# Patient Record
(demographics unavailable — no encounter records)

---

## 2024-10-21 NOTE — PLAN
[TextEntry] : CXR stat. Doxy. Prednisone. Continue trelegy. Rinse mouth after use. Albuterol prn.   If any worsening get to ER or UC. Cardiology f/u. Annual flu vaccines. Exercise.

## 2024-10-21 NOTE — CONSULT LETTER
[Dear  ___] : Dear  [unfilled], [Courtesy Letter:] : I had the pleasure of seeing your patient, [unfilled], in my office today. [Consult Closing:] : Thank you very much for allowing me to participate in the care of this patient.  If you have any questions, please do not hesitate to contact me. [Karson Landeros MD] : Karson Landeros MD

## 2024-10-21 NOTE — HISTORY OF PRESENT ILLNESS
[Former] : former [Snoring] : snoring [Daytime Somnolence] : daytime somnolence [Awakes Unrefreshed] : awakening unrefreshed [Recent  Weight Gain] : recent weight gain [Date: ___] : Date of most recent diagnostic polysomnogram: [unfilled] [AHI: ___ per hour] : Apnea-hypopnea index:  [unfilled] per hour [YearQuit] : 2015 [FreeTextEntry1] : Hx emphysema.   Acute visit.  About 8 d/a had nasal congestion, fever to 102 for 3 days; no trt sought. Took tylenol. Felt better. Then, about 3 days ago begn with chest congestion, cough, mucous. No more fever. More sob, wheeze. Now on trelegy.  Albuterol helping. Mucinex helping.   F/U CT chest following changes during URI reviewed below.   In the past, had PET/CT for GG nodule. Minimal FDG activity. Saw Dr Wyatt. Would need lobectomy given location.   Hx CAD.   Quit smoking in 2015. Works as a RN at the hospital.

## 2024-10-21 NOTE — PHYSICAL EXAM
[General Appearance - In No Acute Distress] : no acute distress [Normal Conjunctiva] : the conjunctiva exhibited no abnormalities [Low Lying Soft Palate] : low lying soft palate [Elongated Uvula] : elongated uvula [III] : III [Neck Appearance] : the appearance of the neck was normal [] : the neck was supple [Heart Rate And Rhythm] : heart rate and rhythm were normal [Heart Sounds] : normal S1 and S2 [Bowel Sounds] : normal bowel sounds [Abdomen Soft] : soft [Abnormal Walk] : normal gait [Nail Clubbing] : no clubbing of the fingernails [Cyanosis, Localized] : no localized cyanosis [Cranial Nerves] : cranial nerves 2-12 were intact [No Focal Deficits] : no focal deficits [Oriented To Time, Place, And Person] : oriented to person, place, and time [Impaired Insight] : insight and judgment were intact [Affect] : the affect was normal [Normal Rate] : the respiratory rate was normal [Rate ___] : at [unfilled] breaths per minute [Normal Rhythm/Effort] : normal respiratory rhythm and effort [Skin Color & Pigmentation] : normal skin color and pigmentation [Normal Oropharynx] : abnormal oropharynx [Rhonchi Bilateral] : rhonchi were heard diffusely over both lungs

## 2024-10-21 NOTE — REVIEW OF SYSTEMS
Patient called with multiple concerns including feeling like she's not getting enough oxygen to her brain. States she feels more labored with her breathing and more fatigued. She also feels like her sternum is  where she had CABG. Denies any chest pain. States she is becoming more forgetful as well.     Will review with Dr Rudolph Izaguirre [As Noted in HPI] : as noted in HPI [Negative] : Endocrine [Fever] : no fever [Chills] : no chills [FreeTextEntry9] : hx CAD

## 2024-10-21 NOTE — PROCEDURE
[FreeTextEntry1] : geo done 6/3/24: mod obstruction, FEV1 78% pred  ----- EXAM: [id number] - CT CHEST - ORDERED BY: SHEBA GOMEZ   PROCEDURE DATE: 06/19/2023    INTERPRETATION: INDICATION: Lung nodules, right middle lobe atelectasis TECHNIQUE: Unenhanced CT of the chest. Coronal, sagittal, and MIP images were reconstructed and reviewed. COMPARISON: March 8, 2023 chest CT.  FINDINGS:  AIRWAYS, LUNGS, PLEURA: Patent central airways. Emphysema. Stable cluster of small nodules within right upper lobe. Right middle lobe atelectasis is resolved. Stable bandlike atelectasis within the lingula. No pleural effusion.  LYMPH NODES, MEDIASTINUM: No lymphadenopathy.  HEART, VESSELS: Heart size is normal. No pericardial effusion. Thoracic aorta normal in diameter. Coronary and aortic calcifications.  VISUALIZED UPPER ABDOMEN: Cholecystectomy. Small left renal cyst.  CHEST WALL, BONES: No aggressive osseous lesion.  LOWER NECK: Within normal limits.  IMPRESSION:  Stable cluster of small nodules within right upper lobe. Right middle lobe atelectasis is resolved. Stable bandlike atelectasis within the lingula.  Emphysema. Recommend annual lung cancer screening with low-dose chest CT if the patient meets the criteria.  --- End of Report ---       LAURO GAMING MD; Attending Radiologist This document has been electronically signed. Jun 28

## 2024-12-10 NOTE — HISTORY OF PRESENT ILLNESS
[Former] : Former [TextBox_13] : Ms. RICE is a 61 year old female with a history of CAD s/p stents, HTN, high cholesterol, COPD and skin cancer.. Reviewed and confirmed that the patient meets screening eligibility criteria: 61 years old Smoking Status: Former smoker Number of pack(s) per day: 1 Number of years smoked: 35 Number of pack years smokin Quit year:   No symptoms of lung cancer, including new cough, change in cough, hemoptysis, and unintentional weight loss. No personal history of lung cancer. No lung cancer in a first degree relative. No history of occupational exposures.   [YearQuit] : 2015 [PacksperYear] : 35

## 2024-12-19 NOTE — PLAN
[TextEntry] : ANother course of prednisone. Sputum Cx. Albuterol via neb prn or MDI prn. Hypersal nebs BID. Continue trelegy. Rinse mouth after use.  Mucinex. Repeat CT in 3 months. Cardiology f/u. Annual flu vaccines. Exercise.

## 2024-12-19 NOTE — HISTORY OF PRESENT ILLNESS
[Former] : former [Snoring] : snoring [Daytime Somnolence] : daytime somnolence [Awakes Unrefreshed] : awakening unrefreshed [Recent  Weight Gain] : recent weight gain [Date: ___] : Date of most recent diagnostic polysomnogram: [unfilled] [AHI: ___ per hour] : Apnea-hypopnea index:  [unfilled] per hour [YearQuit] : 2015 [FreeTextEntry1] : Hx emphysema.   re 10/21/24 fo an acute visit with [facility]. Prednisone and doxy given. CXR done 10/21/24 showed atx vs scarring L base, no CHF, no pna. She was called and was feeling better.   Improved then about 2 weeks ago more cough, tightness, not much mucous. Wet cough though. Took prednisone she had at home for about 2 days; stopped 12/16/24. Now on trelegy.  Albuterol helping. Mucinex helping.   F/U CT chest done 12/11/24 reviewed below; feeling not well at the time of the CT.   In the past, had PET/CT for GG nodule. Minimal FDG activity. Saw Dr Wyatt. Would need lobectomy given location.   Hx CAD.   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

## 2024-12-19 NOTE — PHYSICAL EXAM
[General Appearance - In No Acute Distress] : no acute distress [Normal Conjunctiva] : the conjunctiva exhibited no abnormalities [Low Lying Soft Palate] : low lying soft palate [Elongated Uvula] : elongated uvula [III] : III [Neck Appearance] : the appearance of the neck was normal [] : the neck was supple [Heart Rate And Rhythm] : heart rate and rhythm were normal [Heart Sounds] : normal S1 and S2 [Bowel Sounds] : normal bowel sounds [Abdomen Soft] : soft [Abnormal Walk] : normal gait [Nail Clubbing] : no clubbing of the fingernails [Cyanosis, Localized] : no localized cyanosis [Cranial Nerves] : cranial nerves 2-12 were intact [No Focal Deficits] : no focal deficits [Oriented To Time, Place, And Person] : oriented to person, place, and time [Impaired Insight] : insight and judgment were intact [Affect] : the affect was normal [Normal Rate] : the respiratory rate was normal [Rate ___] : at [unfilled] breaths per minute [Normal Rhythm/Effort] : normal respiratory rhythm and effort [Rhonchi Bilateral] : rhonchi were heard diffusely over both lungs [Skin Color & Pigmentation] : normal skin color and pigmentation [Normal Oropharynx] : abnormal oropharynx [Diffuse Wheezing] : diffuse wheezing was heard [Rhonchi Right] : rhonchi were heard diffusely over the right lung

## 2024-12-19 NOTE — PROCEDURE
[FreeTextEntry1] : geo done 6/3/24: mod obstruction, FEV1 78% pred  ----- EXAM: [id number] - CT LDCT LUNG CA SCREENING - ORDERED BY: SHEBA GOMEZ   PROCEDURE DATE: 12/11/2024    INTERPRETATION: CLINICAL INFORMATION: Lung Cancer Screening. Former smoker with a 35 pack year smoking history quit 2015. 61-year-old female with CAD, status post stents, hypertension COPD.  COMPARISON: 6/19/2023 Submitted for my review 12/16/2024  TECHNIQUE: Low Dose Chest CT was obtained without intravenous contrast.  FINDINGS:  AIRWAYS/LUNGS/PLEURA: Patent central airways. Mild centrilobular emphysema with bilateral upper lobe blebs. No pulmonary consolidation. No pulmonary masses. Right middle lobe and inferior lingula linear atelectasis or scarring. No pleural effusion.  Again noted is a cluster of nodular densities within the right upper lobe each somewhat branching and ill-defined part solid, the more superior lesion extending from images 254 through 286 measuring 0.5 x 1.1 cm and the slightly more inferior lesion extending from 287 through 315 measuring 0.6 x 1.0 cm maximum dimension. An additional 0.6 cm nodular density series 5 image 277. These have minimally increased in length as compared to prior exam prior exam. They are nonspecific however may represent areas of mucus plugging and/or inflammatory process however neoplastic process cannot be entirely excluded..  MEDIASTINUM AND LILLIE: No lymphadenopathy.  VESSELS: Within normal limits caliber. Scattered atherosclerotic calcifications throughout the aorta..  HEART: Heart size is normal. No pericardial effusion. Coronary artery calcifications and/or stents.  VISUALIZED UPPER ABDOMEN: Within normal limits.  CHEST WALL AND BONES: The chest wall is within normal limits. Multilevel degenerative changes of the spine.  IMPRESSION: Branching ill-defined port solid cluster of nodular densities within the right upper lobe as described above, slightly increased in size from prior exam.. While this may be related to inflammatory process and/or mucous plugging, cannot exclude neoplastic process.  No new nodules or masses.  Mild centrilobular emphysema  LungRADS 4B Very Suspicious  Recommendation: Diagnostic Chest CT With or Without Contrast, PET CT or Tissue Sampling  --- End of Report ---       CED MAYER MD; Attending Radiologist This document has been electronically signed. Dec 16 2024 5:04PM  --------- EXAM: [id number] - CT CHEST - ORDERED BY: SHEBA GOMEZ   PROCEDURE DATE: 06/19/2023    INTERPRETATION: INDICATION: Lung nodules, right middle lobe atelectasis TECHNIQUE: Unenhanced CT of the chest. Coronal, sagittal, and MIP images were reconstructed and reviewed. COMPARISON: March 8, 2023 chest CT.  FINDINGS:  AIRWAYS, LUNGS, PLEURA: Patent central airways. Emphysema. Stable cluster of small nodules within right upper lobe. Right middle lobe atelectasis is resolved. Stable bandlike atelectasis within the lingula. No pleural effusion.  LYMPH NODES, MEDIASTINUM: No lymphadenopathy.  HEART, VESSELS: Heart size is normal. No pericardial effusion. Thoracic aorta normal in diameter. Coronary and aortic calcifications.  VISUALIZED UPPER ABDOMEN: Cholecystectomy. Small left renal cyst.  CHEST WALL, BONES: No aggressive osseous lesion.  LOWER NECK: Within normal limits.  IMPRESSION:  Stable cluster of small nodules within right upper lobe. Right middle lobe atelectasis is resolved. Stable bandlike atelectasis within the lingula.  Emphysema. Recommend annual lung cancer screening with low-dose chest CT if the patient meets the criteria.  --- End of Report ---       LAURO GAMING MD; Attending Radiologist This document has been electronically signed. Jun 28

## 2024-12-19 NOTE — REVIEW OF SYSTEMS
[As Noted in HPI] : as noted in HPI [Negative] : Endocrine [Fever] : no fever [Chills] : no chills [FreeTextEntry9] : hx CAD

## 2025-04-03 NOTE — PROCEDURE
[FreeTextEntry1] : geo done 6/3/24: mod obstruction, FEV1 78% pred  ----- EXAM: [id number] - CT CHEST - ORDERED BY: SHEBA GOMEZ   PROCEDURE DATE: 03/18/2025    INTERPRETATION: CLINICAL INFORMATION: Nodule follow-up  COMPARISON: 12/11/2024 and 6/19/2023  CONTRAST/COMPLICATIONS: IV Contrast: NONE Oral Contrast: NONE .  PROCEDURE: CT scan of the chest was obtained without intravenous contrast.  FINDINGS:  AIRWAYS/LUNGS/PLEURA: Patent central airways. Mild centrilobular emphysema with bilateral upper lobe blebs. No pulmonary consolidation. No pulmonary masses. Right middle lobe and inferior lingula linear atelectasis or scarring. No pleural effusion.  Again noted is a cluster of nodular densities within the right upper lobe each somewhat branching and ill-defined part solid, the more superior lesion measuring 12 x 9 mm, previously 11 x 10 mm and the slightly more inferior lesion measuring 12 x 8 mm, previously 12 x 8 mm. Previously noted 6 mm additional nodule is not well delineated on the current exam. These are similar in size to the prior exam but larger since 6/19/23. They are nonspecific however may represent areas of mucus plugging and/or inflammatory process however neoplastic process cannot be entirely excluded.  Minimal additional groundglass opacities are noted predominantly in the left upper and lower lobes may be infectious or inflammatory.  MEDIASTINUM AND LILLIE: No lymphadenopathy.  VESSELS: Within normal limits caliber. Scattered atherosclerotic calcifications throughout the aorta..  HEART: Heart size is normal. No pericardial effusion. Coronary artery calcifications and/or stents.  VISUALIZED UPPER ABDOMEN: Fluid density lesion in the left upper renal pole compatible with a cyst  CHEST WALL AND BONES: The chest wall is within normal limits. Multilevel degenerative changes of the spine.   IMPRESSION:   Since 12/11/2024;  Stable right upper lobe nodularities  Minimal additional groundglass opacities predominantly in the left upper and lower lobe may be infectious or inflammatory in etiology.  Otherwise stable exam.  --- End of Report ---       CARMELO AZUL MD; Attending Radiologist This document has been electronically signed. Mar 19 2025 6:20AM   ---------- EXAM: [id number] - CT LDCT LUNG CA SCREENING - ORDERED BY: SHEBA GOMEZ   PROCEDURE DATE: 12/11/2024    INTERPRETATION: CLINICAL INFORMATION: Lung Cancer Screening. Former smoker with a 35 pack year smoking history quit 2015. 61-year-old female with CAD, status post stents, hypertension COPD.  COMPARISON: 6/19/2023 Submitted for my review 12/16/2024  TECHNIQUE: Low Dose Chest CT was obtained without intravenous contrast.  FINDINGS:  AIRWAYS/LUNGS/PLEURA: Patent central airways. Mild centrilobular emphysema with bilateral upper lobe blebs. No pulmonary consolidation. No pulmonary masses. Right middle lobe and inferior lingula linear atelectasis or scarring. No pleural effusion.  Again noted is a cluster of nodular densities within the right upper lobe each somewhat branching and ill-defined part solid, the more superior lesion extending from images 254 through 286 measuring 0.5 x 1.1 cm and the slightly more inferior lesion extending from 287 through 315 measuring 0.6 x 1.0 cm maximum dimension. An additional 0.6 cm nodular density series 5 image 277. These have minimally increased in length as compared to prior exam prior exam. They are nonspecific however may represent areas of mucus plugging and/or inflammatory process however neoplastic process cannot be entirely excluded..  MEDIASTINUM AND LILLIE: No lymphadenopathy.  VESSELS: Within normal limits caliber. Scattered atherosclerotic calcifications throughout the aorta..  HEART: Heart size is normal. No pericardial effusion. Coronary artery calcifications and/or stents.  VISUALIZED UPPER ABDOMEN: Within normal limits.  CHEST WALL AND BONES: The chest wall is within normal limits. Multilevel degenerative changes of the spine.  IMPRESSION: Branching ill-defined port solid cluster of nodular densities within the right upper lobe as described above, slightly increased in size from prior exam.. While this may be related to inflammatory process and/or mucous plugging, cannot exclude neoplastic process.  No new nodules or masses.  Mild centrilobular emphysema  LungRADS 4B Very Suspicious  Recommendation: Diagnostic Chest CT With or Without Contrast, PET CT or Tissue Sampling  --- End of Report ---       CED MAYER MD; Attending Radiologist This document has been electronically signed. Dec 16 2024 5:04PM  --------- EXAM: [id number] - CT CHEST - ORDERED BY: SHEBA GOMEZ   PROCEDURE DATE: 06/19/2023    INTERPRETATION: INDICATION: Lung nodules, right middle lobe atelectasis TECHNIQUE: Unenhanced CT of the chest. Coronal, sagittal, and MIP images were reconstructed and reviewed. COMPARISON: March 8, 2023 chest CT.  FINDINGS:  AIRWAYS, LUNGS, PLEURA: Patent central airways. Emphysema. Stable cluster of small nodules within right upper lobe. Right middle lobe atelectasis is resolved. Stable bandlike atelectasis within the lingula. No pleural effusion.  LYMPH NODES, MEDIASTINUM: No lymphadenopathy.  HEART, VESSELS: Heart size is normal. No pericardial effusion. Thoracic aorta normal in diameter. Coronary and aortic calcifications.  VISUALIZED UPPER ABDOMEN: Cholecystectomy. Small left renal cyst.  CHEST WALL, BONES: No aggressive osseous lesion.  LOWER NECK: Within normal limits.  IMPRESSION:  Stable cluster of small nodules within right upper lobe. Right middle lobe atelectasis is resolved. Stable bandlike atelectasis within the lingula.  Emphysema. Recommend annual lung cancer screening with low-dose chest CT if the patient meets the criteria.  --- End of Report ---       LAURO GAMING MD; Attending Radiologist This document has been electronically signed. Jun 28

## 2025-04-03 NOTE — PLAN
[TextEntry] : PET scan even though not high chance of activity. See Dr Wyatt for opinion on options for bx. Albuterol via neb prn or MDI prn. Hypersal nebs BID. Continue trelegy. Rinse mouth after use.  Mucinex.  Cardiology f/u. Annual flu vaccines. Exercise.

## 2025-04-03 NOTE — HISTORY OF PRESENT ILLNESS
[Former] : former [Snoring] : snoring [Daytime Somnolence] : daytime somnolence [Awakes Unrefreshed] : awakening unrefreshed [Recent  Weight Gain] : recent weight gain [Date: ___] : Date of most recent diagnostic polysomnogram: [unfilled] [AHI: ___ per hour] : Apnea-hypopnea index:  [unfilled] per hour [YearQuit] : 2015 [FreeTextEntry1] : Hx emphysema.   Feeling at baseline. Occasional cough, congestion. On trelegy. Albuterol prn. Not using saline nebs much because does not feel she needs it now.  Had CT chest done 12/11/24 reviewed below; feeling not well at the time of the CT.   Sputum cx and AFB neg on 1/6/25.  F/U CT chest reviewed below.   In the past, had PET/CT for GG nodule. Minimal FDG activity. Saw Dr Wyatt. Would need lobectomy given location.   Hx CAD. Has had 3 stents recently. Sees Dr Esparza in [place]. On plavix.  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

## 2025-04-03 NOTE — PHYSICAL EXAM
[General Appearance - In No Acute Distress] : no acute distress [Normal Conjunctiva] : the conjunctiva exhibited no abnormalities [Low Lying Soft Palate] : low lying soft palate [Elongated Uvula] : elongated uvula [III] : III [Neck Appearance] : the appearance of the neck was normal [] : the neck was supple [Heart Rate And Rhythm] : heart rate and rhythm were normal [Heart Sounds] : normal S1 and S2 [Bowel Sounds] : normal bowel sounds [Abdomen Soft] : soft [Abnormal Walk] : normal gait [Nail Clubbing] : no clubbing of the fingernails [Cyanosis, Localized] : no localized cyanosis [Cranial Nerves] : cranial nerves 2-12 were intact [No Focal Deficits] : no focal deficits [Oriented To Time, Place, And Person] : oriented to person, place, and time [Affect] : the affect was normal [Impaired Insight] : insight and judgment were intact [Normal Rate] : the respiratory rate was normal [Rate ___] : at [unfilled] breaths per minute [Normal Rhythm/Effort] : normal respiratory rhythm and effort [Diffuse Wheezing] : diffuse wheezing was heard [Rhonchi Bilateral] : rhonchi were heard diffusely over both lungs [Rhonchi Right] : rhonchi were heard diffusely over the right lung [Skin Color & Pigmentation] : normal skin color and pigmentation [Normal Oropharynx] : abnormal oropharynx

## 2025-04-03 NOTE — ASSESSMENT
[FreeTextEntry1] : Lung nodules appears different. Previously, not able to bx w/o surgery. ?can get with navigational? Problem now is also that she is on plavix from recent stents. Clinically at baseline.

## 2025-04-28 NOTE — DATA REVIEWED
[FreeTextEntry1] : Independent review of imaging and independent interpretation was performed at today's visit. -3/18/25 CT Chest non contrast through [facility] -4/15/25 PET Scan through [facility]

## 2025-04-28 NOTE — ASSESSMENT
[FreeTextEntry1] : PREOPERATIVE CHECKLIST: (Reviewed with patient in office) - Confirm allergies, including latex: Codeine - Confirm pacemaker: None - Anticoagulation/antiplatelets noted and will be discontinued/continued: Plavix (d/c 5 days - cannot be discontinued until 3 months following cardiac stent placement which would be after 6/27/25); Aspirin 81mg (maintain) - SGLT-2 Inhibitors (discontinued 3 days prior to surgery) or GLP-1 (discontinued 1 week prior to surgery): None - All other supplements, NSAIDs and fish oil were discussed and will be held one week before surgery  Maria D is a 62-year-old female with a right upper lobe nodule that has some activity by PET scan and may represent a slow-growing malignancy.  I will be arranging a robotic bronchoscopy in the near future to obtain tissue.  Of note she has had recent coronary stents and requires Plavix until June 27, 2025 so her procedure will have to be scheduled after that time.  Thank you for allowing me to participate in the care of your patient.  30 minutes was spent during this encounter.  Kun Wyatt MD Department of Cardiovascular and Thoracic Surgery  [employer] [facility]

## 2025-04-28 NOTE — HISTORY OF PRESENT ILLNESS
[FreeTextEntry1] : Ms. CORTEZ is a 62-year-old female referred by Dr. Landeros for consultation regarding lung nodules. Previously she was evaluated in 2019 for a right upper lobe ground glass opacity and was recommended to discuss with pulmonology lobectomy vs continued surveillance. However, she was lost to follow up.    Past Medical History is significant for HLD, CAD, NSTEMI (2009) s/p PCI (total of 5 stents - 2009, 2015; most recently received 2 stents on 3/7/25, and additional 1 stent 3/27/25), DM, HTN, GERD, former smoker, squamous cell carcinoma (skin), Emphysema, diverticulitis s/p colon resection with perforation (2018)  6/3/24 Spirometry testing through [facility] FEV 1 77.9%  3/18/25 CT Chest non contrast through [facility] -Since 12/11/2024; Stable right upper lobe nodularities -Minimal additional groundglass opacities predominantly in the left upper and lower lobe may be infectious or inflammatory in etiology  4/15/25 PET Scan through [facility] -The right upper lobe nodule evident on CT 3/18/2025 is FDG avid, concerning for malignancy. -No FDG avid intrathoracic lymphadenopathy. -FDG uptake in the right deltoid muscle and right axillary and subpectoral lymph nodes suggest recent vaccination.  Today Ms. Cortez reports shortness of breath on exertion and fatigue. She denies chest pain, palpitations, cough, dysphagia, nausea/vomiting, fevers, chills, unintentional weight loss or gain, and night sweats.  Cardiology: Dr. Jorge Luis Del Real

## 2025-04-28 NOTE — HISTORY OF PRESENT ILLNESS
[FreeTextEntry1] : Ms. CORTEZ is a 62-year-old female referred by Dr. Landeros for consultation regarding lung nodules. Previously she was evaluated in 2019 for a right upper lobe ground glass opacity and was recommended to discuss with pulmonology lobectomy vs continued surveillance. However, she was lost to follow up.    Past Medical History is significant for HLD, CAD, NSTEMI (2009) s/p PCI (total of 5 stents - 2009, 2015; most recently received 2 stents on 3/7/25, and additional 1 stent 3/27/25), DM, HTN, GERD, former smoker, squamous cell carcinoma (skin), Emphysema, diverticulitis s/p colon resection with perforation (2018)  6/3/24 Spirometry testing through [facility] FEV 1 77.9%  3/18/25 CT Chest non contrast through [facility] -Since 12/11/2024; Stable right upper lobe nodularities -Minimal additional groundglass opacities predominantly in the left upper and lower lobe may be infectious or inflammatory in etiology  4/15/25 PET Scan through [facility] -The right upper lobe nodule evident on CT 3/18/2025 is FDG avid, concerning for malignancy. -No FDG avid intrathoracic lymphadenopathy. -FDG uptake in the right deltoid muscle and right axillary and subpectoral lymph nodes suggest recent vaccination.  Today Ms. Cortez reports shortness of breath on exertion and fatigue. She denies chest pain, palpitations, cough, dysphagia, nausea/vomiting, fevers, chills, unintentional weight loss or gain, and night sweats.  Cardiology: Dr. Jorge Luis Del Real

## 2025-04-28 NOTE — DATA REVIEWED
[FreeTextEntry1] : Independent review of imaging and independent interpretation was performed at today's visit. -3/18/25 CT Chest non contrast through [facility] -4/15/25 PET Scan through [facility]

## 2025-04-28 NOTE — REVIEW OF SYSTEMS
[Feeling Tired] : feeling tired [SOB on Exertion] : shortness of breath during exertion [Negative] : Heme/Lymph [Fever] : no fever [Chills] : no chills [Feeling Poorly] : not feeling poorly [Shortness Of Breath] : no shortness of breath [Wheezing] : no wheezing [Cough] : no cough

## 2025-04-28 NOTE — ASSESSMENT
[FreeTextEntry1] : PREOPERATIVE CHECKLIST: (Reviewed with patient in office) - Confirm allergies, including latex: Codeine - Confirm pacemaker: None - Anticoagulation/antiplatelets noted and will be discontinued/continued: Plavix (d/c 5 days - cannot be discontinued until 3 months following cardiac stent placement which would be after 6/27/25); Aspirin 81mg (maintain) - SGLT-2 Inhibitors (discontinued 3 days prior to surgery) or GLP-1 (discontinued 1 week prior to surgery): None - All other supplements, NSAIDs and fish oil were discussed and will be held one week before surgery  Maria D is a 62-year-old female with a right upper lobe nodule that has some activity by PET scan and may represent a slow-growing malignancy.  I will be arranging a robotic bronchoscopy in the near future to obtain tissue.  Of note she has had recent coronary stents and requires Plavix until June 27, 2025 so her procedure will have to be scheduled after that time.  Thank you for allowing me to participate in the care of your patient.  30 minutes was spent during this encounter.  Kun Wyatt MD Department of Cardiovascular and Thoracic Surgery  [employer] [facility]

## 2025-04-28 NOTE — PHYSICAL EXAM
[General Appearance - Alert] : alert [General Appearance - In No Acute Distress] : in no acute distress [] : no respiratory distress [Auscultation Breath Sounds / Voice Sounds] : lungs were clear to auscultation bilaterally [No Focal Deficits] : no focal deficits [Oriented To Time, Place, And Person] : oriented to person, place, and time [Impaired Insight] : insight and judgment were intact [Affect] : the affect was normal

## 2025-07-08 NOTE — PHYSICAL EXAM
[General Appearance - In No Acute Distress] : no acute distress [Normal Conjunctiva] : the conjunctiva exhibited no abnormalities [Low Lying Soft Palate] : low lying soft palate [Elongated Uvula] : elongated uvula [III] : III [Neck Appearance] : the appearance of the neck was normal [] : the neck was supple [Heart Rate And Rhythm] : heart rate and rhythm were normal [Heart Sounds] : normal S1 and S2 [Bowel Sounds] : normal bowel sounds [Abdomen Soft] : soft [Abnormal Walk] : normal gait [Nail Clubbing] : no clubbing of the fingernails [Cyanosis, Localized] : no localized cyanosis [Cranial Nerves] : cranial nerves 2-12 were intact [No Focal Deficits] : no focal deficits [Oriented To Time, Place, And Person] : oriented to person, place, and time [Impaired Insight] : insight and judgment were intact [Affect] : the affect was normal [Normal Rate] : the respiratory rate was normal [Rate ___] : at [unfilled] breaths per minute [Normal Rhythm/Effort] : normal respiratory rhythm and effort [Diffuse Wheezing] : diffuse wheezing was heard [Rhonchi Bilateral] : rhonchi were heard diffusely over both lungs [Rhonchi Right] : rhonchi were heard diffusely over the right lung [Skin Color & Pigmentation] : normal skin color and pigmentation [Normal Oropharynx] : abnormal oropharynx

## 2025-07-08 NOTE — PLAN
Addended by: ROSALINDA MCCLELLAN on: 5/19/2020 03:22 PM     Modules accepted: Orders    
[TextEntry] : Robotic bronch as planned on 7/23.  Will f/u path. Albuterol via neb prn or MDI prn. Hypersal nebs BID prn. Continue trelegy. Rinse mouth after use.  Mucinex.  Cardiology f/u. Annual flu vaccines. Exercise.

## 2025-07-08 NOTE — PROCEDURE
[FreeTextEntry1] : geo done 6/3/24: mod obstruction, FEV1 78% pred  ----- EXAM: [id number] - PETCT SKUL-THI ONC FDG INIT - ORDERED BY: ALYSE GOMEZ   PROCEDURE DATE: 04/15/2025    INTERPRETATION: CLINICAL INFORMATION: Lung nodules on recent CT. Evaluate for signs of malignancy.  TREATMENT STRATEGY EVALUATION: Initial AREA IMAGED: Skull base-to-thigh FASTING BLOOD SUGAR: 112 mg/dl RADIOPHARMACEUTICAL: 11.99 mCi F-18 FDG, I.V. I.V. SITE: antecubital left UPTAKE PERIOD: 53 min SCANNER: registracija vozila ORAL CONTRAST: Omnipaque 300 PHARMACOLOGIC INTERVENTION: None.  TECHNIQUE: Following intravenous injection of above radiopharmaceutical and uptake period, PET/CT was performed. CT protocol was optimized for PET attenuation correction and anatomic localization and was not designed to produce and cannot replace state-of-the-art diagnostic CT images with specific imaging protocols for different body parts and indications. Images were reconstructed and reviewed in axial, coronal and sagittal views and three-dimensional MIP.  The standardized uptake values (SUV) are normalized to patient body weight and indicate the highest activity concentration (SUVmax) in a given site. All image numbers refer to axial image number.  COMPARISON: February 20, 2019  OTHER STUDIES USED FOR CORRELATION: CT chest 3/18/2025  FINDINGS:  HEAD/NECK: Physiologic FDG activity in visualized brain, head, and neck.  THORAX:Focal FDG uptake in the right deltoid muscle (image 33), likely recent vaccination. FDG avid right axillary and subpectoral lymph nodes, likely reactive. For example, a right subpectoral lymph node measures 1.0 x 0.5 cm, SUV 3.9 (image 71). No FDG avid breast lesions.  LUNGS: A right upper lobe bilobed nodule or adjacent nodules measuring approximately 1.6 x 0.9 cm, SUV 5.2 (image 72); similar to CT chest 3/18/2025 given differences in imaging technique.  PLEURA/PERICARDIUM: No abnormal FDG activity. No effusion.  HEPATOBILIARY/PANCREAS: Physiologic FDG activity. For reference, normal liver demonstrates SUV mean 2.9. Cholecystectomy.  SPLEEN: Physiologic FDG activity. Normal in size.  ADRENAL GLANDS: No abnormal FDG activity. No nodule.  KIDNEYS/URINARY BLADDER: Physiologic excreted FDG activity. Left renal cyst.  REPRODUCTIVE ORGANS: No abnormal FDG activity.  ABDOMINOPELVIC LYMPH NODES/RETROPERITONEUM: No enlarged or FDG-avid lymph node.  ESOPHAGUS/STOMACH/BOWEL/PERITONEUM/MESENTERY: Mild inflammatory uptake in the distal esophagus. Favor physiological bowel activity. Diverticulosis.  VESSELS: Coronary and large vessel calcifications. No aneurysm.  BONES/SOFT TISSUES: No abnormal FDG activity.  IMPRESSION:  1. The right upper lobe nodule evident on CT 3/18/2025 is FDG avid (SUV 3.9), concerning for malignancy.  2. No FDG avid intrathoracic lymphadenopathy.  3. FDG uptake in the right deltoid muscle and right axillary and subpectoral lymph nodes suggest recent vaccination. Recommend clinical correlation.  --- End of Report ---       SEAN BLAKE MD; Attending Radiologist This document has been electronically signed. Apr 16 2025 9:00PM   ---------- EXAM: [id number] - CT CHEST - ORDERED BY: SHEBA GOMEZ   PROCEDURE DATE: 03/18/2025    INTERPRETATION: CLINICAL INFORMATION: Nodule follow-up  COMPARISON: 12/11/2024 and 6/19/2023  CONTRAST/COMPLICATIONS: IV Contrast: NONE Oral Contrast: NONE .  PROCEDURE: CT scan of the chest was obtained without intravenous contrast.  FINDINGS:  AIRWAYS/LUNGS/PLEURA: Patent central airways. Mild centrilobular emphysema with bilateral upper lobe blebs. No pulmonary consolidation. No pulmonary masses. Right middle lobe and inferior lingula linear atelectasis or scarring. No pleural effusion.  Again noted is a cluster of nodular densities within the right upper lobe each somewhat branching and ill-defined part solid, the more superior lesion measuring 12 x 9 mm, previously 11 x 10 mm and the slightly more inferior lesion measuring 12 x 8 mm, previously 12 x 8 mm. Previously noted 6 mm additional nodule is not well delineated on the current exam. These are similar in size to the prior exam but larger since 6/19/23. They are nonspecific however may represent areas of mucus plugging and/or inflammatory process however neoplastic process cannot be entirely excluded.  Minimal additional groundglass opacities are noted predominantly in the left upper and lower lobes may be infectious or inflammatory.  MEDIASTINUM AND LILLIE: No lymphadenopathy.  VESSELS: Within normal limits caliber. Scattered atherosclerotic calcifications throughout the aorta..  HEART: Heart size is normal. No pericardial effusion. Coronary artery calcifications and/or stents.  VISUALIZED UPPER ABDOMEN: Fluid density lesion in the left upper renal pole compatible with a cyst  CHEST WALL AND BONES: The chest wall is within normal limits. Multilevel degenerative changes of the spine.   IMPRESSION:   Since 12/11/2024;  Stable right upper lobe nodularities  Minimal additional groundglass opacities predominantly in the left upper and lower lobe may be infectious or inflammatory in etiology.  Otherwise stable exam.  --- End of Report ---       CARMELO AZUL MD; Attending Radiologist This document has been electronically signed. Mar 19 2025 6:20AM   ---------- EXAM: [id number] - CT LDCT LUNG CA SCREENING - ORDERED BY: SHEBA GOMEZ   PROCEDURE DATE: 12/11/2024    INTERPRETATION: CLINICAL INFORMATION: Lung Cancer Screening. Former smoker with a 35 pack year smoking history quit 2015. 61-year-old female with CAD, status post stents, hypertension COPD.  COMPARISON: 6/19/2023 Submitted for my review 12/16/2024  TECHNIQUE: Low Dose Chest CT was obtained without intravenous contrast.  FINDINGS:  AIRWAYS/LUNGS/PLEURA: Patent central airways. Mild centrilobular emphysema with bilateral upper lobe blebs. No pulmonary consolidation. No pulmonary masses. Right middle lobe and inferior lingula linear atelectasis or scarring. No pleural effusion.  Again noted is a cluster of nodular densities within the right upper lobe each somewhat branching and ill-defined part solid, the more superior lesion extending from images 254 through 286 measuring 0.5 x 1.1 cm and the slightly more inferior lesion extending from 287 through 315 measuring 0.6 x 1.0 cm maximum dimension. An additional 0.6 cm nodular density series 5 image 277. These have minimally increased in length as compared to prior exam prior exam. They are nonspecific however may represent areas of mucus plugging and/or inflammatory process however neoplastic process cannot be entirely excluded..  MEDIASTINUM AND LILLIE: No lymphadenopathy.  VESSELS: Within normal limits caliber. Scattered atherosclerotic calcifications throughout the aorta..  HEART: Heart size is normal. No pericardial effusion. Coronary artery calcifications and/or stents.  VISUALIZED UPPER ABDOMEN: Within normal limits.  CHEST WALL AND BONES: The chest wall is within normal limits. Multilevel degenerative changes of the spine.  IMPRESSION: Branching ill-defined port solid cluster of nodular densities within the right upper lobe as described above, slightly increased in size from prior exam.. While this may be related to inflammatory process and/or mucous plugging, cannot exclude neoplastic process.  No new nodules or masses.  Mild centrilobular emphysema  LungRADS 4B Very Suspicious  Recommendation: Diagnostic Chest CT With or Without Contrast, PET CT or Tissue Sampling  --- End of Report ---       CED MAYER MD; Attending Radiologist This document has been electronically signed. Dec 16 2024 5:04PM  --------- EXAM: [id number] - CT CHEST - ORDERED BY: SHEBA GOMEZ   PROCEDURE DATE: 06/19/2023    INTERPRETATION: INDICATION: Lung nodules, right middle lobe atelectasis TECHNIQUE: Unenhanced CT of the chest. Coronal, sagittal, and MIP images were reconstructed and reviewed. COMPARISON: March 8, 2023 chest CT.  FINDINGS:  AIRWAYS, LUNGS, PLEURA: Patent central airways. Emphysema. Stable cluster of small nodules within right upper lobe. Right middle lobe atelectasis is resolved. Stable bandlike atelectasis within the lingula. No pleural effusion.  LYMPH NODES, MEDIASTINUM: No lymphadenopathy.  HEART, VESSELS: Heart size is normal. No pericardial effusion. Thoracic aorta normal in diameter. Coronary and aortic calcifications.  VISUALIZED UPPER ABDOMEN: Cholecystectomy. Small left renal cyst.  CHEST WALL, BONES: No aggressive osseous lesion.  LOWER NECK: Within normal limits.  IMPRESSION:  Stable cluster of small nodules within right upper lobe. Right middle lobe atelectasis is resolved. Stable bandlike atelectasis within the lingula.  Emphysema. Recommend annual lung cancer screening with low-dose chest CT if the patient meets the criteria.  --- End of Report ---       LAURO GAMING MD; Attending Radiologist This document has been electronically signed. Jun 28

## 2025-07-08 NOTE — HISTORY OF PRESENT ILLNESS
[Former] : former [Snoring] : snoring [Daytime Somnolence] : daytime somnolence [Awakes Unrefreshed] : awakening unrefreshed [Recent  Weight Gain] : recent weight gain [Date: ___] : Date of most recent diagnostic polysomnogram: [unfilled] [AHI: ___ per hour] : Apnea-hypopnea index:  [unfilled] per hour [YearQuit] : 2015 [FreeTextEntry1] : Hx emphysema.   Feeling at baseline. Occasional cough, congestion. More issues in hot, humid weather.  On trelegy. Albuterol prn. Not using saline nebs much because does not feel she needs it now.  Had PET/CT chest done 12/11/24 reviewed below; has navigational bronch with Dr Wyatt planned for 7/23.  To removed, would need lobectomy given location.   Sputum cx and AFB neg on 1/6/25.  Hx CAD. Has had 3 stents. Sees Dr Esparza in [place]. On plavix.  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